# Patient Record
Sex: FEMALE | Race: BLACK OR AFRICAN AMERICAN | Employment: FULL TIME | URBAN - METROPOLITAN AREA
[De-identification: names, ages, dates, MRNs, and addresses within clinical notes are randomized per-mention and may not be internally consistent; named-entity substitution may affect disease eponyms.]

---

## 2019-06-03 ENCOUNTER — OFFICE VISIT (OUTPATIENT)
Dept: ENDOCRINOLOGY | Age: 44
End: 2019-06-03
Payer: COMMERCIAL

## 2019-06-03 VITALS
BODY MASS INDEX: 32.53 KG/M2 | WEIGHT: 219.6 LBS | OXYGEN SATURATION: 100 % | HEIGHT: 69 IN | SYSTOLIC BLOOD PRESSURE: 122 MMHG | DIASTOLIC BLOOD PRESSURE: 84 MMHG | HEART RATE: 86 BPM

## 2019-06-03 DIAGNOSIS — E53.8 B12 DEFICIENCY: ICD-10-CM

## 2019-06-03 DIAGNOSIS — E04.9 GOITER: ICD-10-CM

## 2019-06-03 DIAGNOSIS — E55.9 VITAMIN D DEFICIENCY: ICD-10-CM

## 2019-06-03 DIAGNOSIS — E66.9 CLASS 1 OBESITY WITH BODY MASS INDEX (BMI) OF 32.0 TO 32.9 IN ADULT, UNSPECIFIED OBESITY TYPE, UNSPECIFIED WHETHER SERIOUS COMORBIDITY PRESENT: ICD-10-CM

## 2019-06-03 DIAGNOSIS — E28.8 HYPERANDROGENISM: ICD-10-CM

## 2019-06-03 DIAGNOSIS — R63.5 WEIGHT GAIN, ABNORMAL: ICD-10-CM

## 2019-06-03 DIAGNOSIS — R53.83 OTHER FATIGUE: ICD-10-CM

## 2019-06-03 PROCEDURE — 99204 OFFICE O/P NEW MOD 45 MIN: CPT | Performed by: INTERNAL MEDICINE

## 2019-06-03 NOTE — PROGRESS NOTES
SUBJECTIVE:  Matthew Saez is a 40 y.o. female who is here for thyroid disease. 1. Goiter    This started in 2014. Patient was diagnosed with fatigue, weight gain. The problem has been gradually worsening. Previous thyroid studies include: TSH and free thyroxine. Patient started medication in N/A. Currently patient is on: N/A. Misses  N/A doses a month. Current complaints: fatigue, weight gain, feeling cold and cold intolerance, constipation, swelling, hair loss, acne, eyebrow loss, brittle nails, difficulty concentrating. History of obstructive symptoms: difficulty swallowing No, changes in voice/hoarseness No.  History of radiation to patient's neck: No  Resent iodine exposure: No  Family history includes hyperthyroidism, hypothyroidism. Family history of thyroid cancer: No    2. Weight gain, abnormal  Gained 36 lbs in 5 years. Very active, works out, eats healthy. Menstrual periods regular. No hirsutism. 3. Class 1 obesity with body mass index (BMI) of 32.0 to 32.9 in adult, unspecified obesity type, unspecified whether serious comorbidity present  Gained 36 lbs in 5 years. Very active, works out, eats healthy. Menstrual periods regular. No hirsutism. Takes Contrave 6 weeks 2 tablets daily. Low carb diet, now eats healthy. On phentermine before, lost weight. 4. Other fatigue  Severe, all day    5. Hyperandrogenism  Boldness on the top of the head, sudden hair loss, since 11/2018. No hirsutism. Lost eyebrows. Never pregnant despite not using protection    6. Vitamin D deficiency  Has fatigue    7. Vitamin B12 deficiency  Has fatigue. On injections before. Result Impression   Impression:  No sonographic abnormalities identified.     Dictated by Ely Lai DO        Workstation [id number]   Result Narrative   Study: US HEAD NECK OR THYROID SURVEY    Indication: Nontoxic nodular goiter,     Comparison: 4/15/2016    Findings:  Grayscale and Doppler images of the thyroid gland were obtained. The right thyroid lobe measures 5.41 x 1.75 x 1.76 cm. The left thyroid lobe measures 4.82 x 1.62 x 1.82 cm.  The thyroid isthmus measures 2.3 mm. The thyroid gland is homogeneous in echotexture.  No focal thyroid nodules or cysts identified. On the previous study dated 4/15/2016, there was a \"suggestion of a subtle solid 1.1 x 0.8 cm nodule in the inferior posterior aspect of the left lobe of thyroid gland\". This area is not identified on the current images. Other Result Information   Interface, Radiant Results - 10/19/2018  3:08 PM EDT  Study: US HEAD NECK OR THYROID SURVEY    Indication: Nontoxic nodular goiter,     Comparison: 4/15/2016    Findings:  Grayscale and Doppler images of the thyroid gland were obtained. The right thyroid lobe measures 5.41 x 1.75 x 1.76 cm. The left thyroid lobe measures 4.82 x 1.62 x 1.82 cm. The thyroid isthmus measures 2.3 mm. The thyroid gland is homogeneous in echotexture. No focal thyroid nodules or cysts identified. On the previous study dated 4/15/2016, there was a \"suggestion of a subtle solid 1.1 x 0.8 cm nodule in the inferior posterior aspect of the left lobe of thyroid gland\". This area is not identified on the current images. Impression:  No sonographic abnormalities identified.     Dictated by Mnigo Back, DO        Workstation IS:[id number]         Past Medical History:   Diagnosis Date    Cystic breast     Enlarged thyroid     Fatigue      Patient Active Problem List    Diagnosis Date Noted    Goiter 06/03/2019    Weight gain, abnormal 06/03/2019    Class 1 obesity with body mass index (BMI) of 32.0 to 32.9 in adult 06/03/2019    Other fatigue 06/03/2019    Hyperandrogenism 06/03/2019    B12 deficiency 06/03/2019    Vitamin D deficiency 06/03/2019     Past Surgical History:   Procedure Laterality Date    BREAST BIOPSY       Family History   Problem Relation Age of Onset    Thyroid Disease Mother    Andrey Pacheco Heart Failure Mother     Thyroid Disease Father     Hypertension Sister      Social History     Socioeconomic History    Marital status:      Spouse name: None    Number of children: None    Years of education: None    Highest education level: None   Occupational History    None   Social Needs    Financial resource strain: None    Food insecurity:     Worry: None     Inability: None    Transportation needs:     Medical: None     Non-medical: None   Tobacco Use    Smoking status: Former Smoker    Smokeless tobacco: Never Used   Substance and Sexual Activity    Alcohol use: Yes    Drug use: Not Currently    Sexual activity: None   Lifestyle    Physical activity:     Days per week: None     Minutes per session: None    Stress: None   Relationships    Social connections:     Talks on phone: None     Gets together: None     Attends Baptism service: None     Active member of club or organization: None     Attends meetings of clubs or organizations: None     Relationship status: None    Intimate partner violence:     Fear of current or ex partner: None     Emotionally abused: None     Physically abused: None     Forced sexual activity: None   Other Topics Concern    None   Social History Narrative    None     Current Outpatient Medications   Medication Sig Dispense Refill    naltrexone-bupropion (CONTRAVE) 8-90 MG per extended release tablet Take 1 tablet by mouth      Diclofenac Sodium (VOLTAREN PO) Take by mouth       No current facility-administered medications for this visit.       Allergies   Allergen Reactions    Hydrocodone-Acetaminophen Itching     Family Status   Relation Name Status    Mother  Alive    Father  [de-identified]    Sister  Alive       Review of Systems:  Constitutional: has fatigue, no fever, has recent weight gain, no recent weight loss, no changes in appetite  Eyes: no eye pain, has change in vision, no eye redness, no eye irritation, no double vision  Ears, nose, throat: Face: examination of head and face revealed no abnormalities  Eyes: no lid or conjunctival swelling, erythema or discharge, pupils are normal, equal, round, reactive to light  Ears/Nose: external inspection of ears and nose revealed no abnormalities, hearing is grossly normal  Oropharynx/Mouth/Face: lips, tongue and gums are normal with no lesions, the voice quality was normal  Neck: neck is supple and symmetric, with midline trachea and no masses, thyroid is enlarged  Lymphatics: normal cervical lymph nodes, normal supraclavicular nodes  Pulmonary: no increased work of breathing or signs of respiratory distress, lungs are clear to auscultation  Cardiovascular: normal heart rate and rhythm, normal S1 and S2, no murmurs and pedal pulses and 2+ bilaterally, No edema  Abdomen: abdomen is soft, non-tender with no masses  Musculoskeletal: normal gait and station and exam of the digits and nails are normal  Neurological: normal coordination and normal general cortical function      Lab Review:    No results found for: WBC, HGB, HCT, MCV, PLT  No results found for: NA, K, CL, CO2, BUN, CREATININE, GLUCOSE, CALCIUM, PROT, LABALBU, BILITOT, ALKPHOS, AST, ALT, LABGLOM, GFRAA, AGRATIO, GLOB  No results found for: TSHFT4, TSH, FT3  No results found for: LABA1C  No results found for: EAG  No results found for: CHOL  No results found for: TRIG  No results found for: HDL  No results found for: LDLCHOLESTEROL, LDLCALC  No results found for: LABVLDL, VLDL  No results found for: CHOLHDLRATIO  No results found for: LABMICR, [id number]  No results found for: ACOG28     ASSESSMENT/PLAN:  1. Goiter  Thyroid sonogram in 9/209  - T4, Free; Future  - T4; Future  - T3, Free; Future  - T3; Future  - Thyroid Peroxidase Antibody; Future  - Anti-Thyroglobulin Antibody; Future  - TSH without Reflex; Future    2. Weight gain, abnormal    - Prolactin; Future  - ACTH; Future  - Cortisol AM, Total; Future  - Follicle Stimulating Hormone;  Future  - Luteinizing Hormone; Future  - Estradiol; Future  - T4, Free; Future  - T4; Future  - T3, Free; Future  - T3; Future  - Thyroid Peroxidase Antibody; Future  - Anti-Thyroglobulin Antibody; Future  - TSH without Reflex; Future  - VITAMIN B12 & FOLATE; Future  - Vitamin D 25 Hydroxy; Future  - DHEA-Sulfate; Future  - Testosterone, free, total; Future  - 17-Hydroxyprogesterone; Future  - SALIVARY CORTISOL; Future  - SALIVARY CORTISOL; Future  - SALIVARY CORTISOL; Future    3. Class 1 obesity with body mass index (BMI) of 32.0 to 32.9 in adult, unspecified obesity type, unspecified whether serious comorbidity present  Diet, exercise. Increase Contrave if OK with PCP, she will discuss this with PCP. 4. Other fatigue    - Prolactin; Future  - ACTH; Future  - Cortisol AM, Total; Future  - Follicle Stimulating Hormone; Future  - Luteinizing Hormone; Future  - Estradiol; Future  - T4, Free; Future  - T4; Future  - T3, Free; Future  - T3; Future  - Thyroid Peroxidase Antibody; Future  - Anti-Thyroglobulin Antibody; Future  - TSH without Reflex; Future  - VITAMIN B12 & FOLATE; Future  - Vitamin D 25 Hydroxy; Future  - DHEA-Sulfate; Future  - Testosterone, free, total; Future  - 17-Hydroxyprogesterone; Future    5. Hyperandrogenism    - Prolactin; Future  - ACTH; Future  - Cortisol AM, Total; Future  - Follicle Stimulating Hormone; Future  - Luteinizing Hormone; Future  - Estradiol; Future  - T4, Free; Future  - T4; Future  - T3, Free; Future  - T3; Future  - Thyroid Peroxidase Antibody; Future  - Anti-Thyroglobulin Antibody; Future  - TSH without Reflex; Future  - VITAMIN B12 & FOLATE; Future  - Vitamin D 25 Hydroxy; Future  - DHEA-Sulfate; Future  - Testosterone, free, total; Future  - 17-Hydroxyprogesterone; Future    6. B12 deficiency    - VITAMIN B12 & FOLATE; Future    7. Vitamin D deficiency    - Vitamin D 25 Hydroxy;  Future      Reviewed and/or ordered clinical lab results Yes  Reviewed and/or ordered radiology tests Yes   Reviewed and/or ordered other diagnostic tests No  Discussed test results with performing physician No  Independently reviewed image, tracing, or specimen No  Made a decision to obtain old records No  Reviewed and summarized old records Yes  TSH 1.9  Negative antibodies  Obtained history from other than patient No    Safia Lashonda was counseled regarding symptoms of thyroid, weight management, goiter, fatigue diagnosis, course and complications of disease if inadequately treated, side effects of medications, diagnosis, treatment options, and prognosis, risks, benefits, complications, and alternatives of treatment, labs, imaging and other studies and treatment targets and goals. She understands instructions and counseling. Return in about 1 month (around 7/3/2019) for thyroid problems, fatigue, 40 min.

## 2019-07-18 ENCOUNTER — OFFICE VISIT (OUTPATIENT)
Dept: ENDOCRINOLOGY | Age: 44
End: 2019-07-18
Payer: COMMERCIAL

## 2019-07-18 VITALS
SYSTOLIC BLOOD PRESSURE: 139 MMHG | HEIGHT: 69 IN | BODY MASS INDEX: 32.53 KG/M2 | HEART RATE: 97 BPM | OXYGEN SATURATION: 99 % | DIASTOLIC BLOOD PRESSURE: 84 MMHG | WEIGHT: 219.6 LBS

## 2019-07-18 DIAGNOSIS — E04.9 GOITER: Primary | ICD-10-CM

## 2019-07-18 DIAGNOSIS — R10.84 GENERALIZED ABDOMINAL PAIN: ICD-10-CM

## 2019-07-18 DIAGNOSIS — E53.8 B12 DEFICIENCY: ICD-10-CM

## 2019-07-18 DIAGNOSIS — E55.9 VITAMIN D DEFICIENCY: ICD-10-CM

## 2019-07-18 DIAGNOSIS — E88.81 INSULIN RESISTANCE: ICD-10-CM

## 2019-07-18 DIAGNOSIS — E28.8 HYPERANDROGENISM: ICD-10-CM

## 2019-07-18 DIAGNOSIS — R53.83 OTHER FATIGUE: ICD-10-CM

## 2019-07-18 PROCEDURE — 99214 OFFICE O/P EST MOD 30 MIN: CPT | Performed by: INTERNAL MEDICINE

## 2019-07-18 NOTE — PROGRESS NOTES
History:   Procedure Laterality Date    BREAST BIOPSY       Family History   Problem Relation Age of Onset    Thyroid Disease Mother     Heart Failure Mother     Thyroid Disease Father     Hypertension Sister      Social History     Socioeconomic History    Marital status:      Spouse name: None    Number of children: None    Years of education: None    Highest education level: None   Occupational History    None   Social Needs    Financial resource strain: None    Food insecurity:     Worry: None     Inability: None    Transportation needs:     Medical: None     Non-medical: None   Tobacco Use    Smoking status: Former Smoker    Smokeless tobacco: Never Used   Substance and Sexual Activity    Alcohol use: Yes    Drug use: Not Currently    Sexual activity: None   Lifestyle    Physical activity:     Days per week: None     Minutes per session: None    Stress: None   Relationships    Social connections:     Talks on phone: None     Gets together: None     Attends Jehovah's witness service: None     Active member of club or organization: None     Attends meetings of clubs or organizations: None     Relationship status: None    Intimate partner violence:     Fear of current or ex partner: None     Emotionally abused: None     Physically abused: None     Forced sexual activity: None   Other Topics Concern    None   Social History Narrative    None     Current Outpatient Medications   Medication Sig Dispense Refill    naltrexone-bupropion (CONTRAVE) 8-90 MG per extended release tablet Take 1 tablet by mouth      Diclofenac Sodium (VOLTAREN PO) Take by mouth       No current facility-administered medications for this visit.       Allergies   Allergen Reactions    Hydrocodone-Acetaminophen Itching     Family Status   Relation Name Status    Mother  Alive    Father  Alive    Sister  Alive       Review of Systems:  Constitutional: has fatigue, no fever, has recent weight gain, no recent weight

## 2021-03-23 ENCOUNTER — VIRTUAL VISIT (OUTPATIENT)
Dept: ENDOCRINOLOGY | Age: 46
End: 2021-03-23
Payer: COMMERCIAL

## 2021-03-23 DIAGNOSIS — R10.84 GENERALIZED ABDOMINAL PAIN: ICD-10-CM

## 2021-03-23 DIAGNOSIS — E55.9 VITAMIN D DEFICIENCY: ICD-10-CM

## 2021-03-23 DIAGNOSIS — E88.81 INSULIN RESISTANCE: ICD-10-CM

## 2021-03-23 DIAGNOSIS — R63.5 WEIGHT GAIN, ABNORMAL: ICD-10-CM

## 2021-03-23 DIAGNOSIS — E04.9 GOITER: Primary | ICD-10-CM

## 2021-03-23 DIAGNOSIS — E66.9 CLASS 1 OBESITY WITH BODY MASS INDEX (BMI) OF 32.0 TO 32.9 IN ADULT, UNSPECIFIED OBESITY TYPE, UNSPECIFIED WHETHER SERIOUS COMORBIDITY PRESENT: ICD-10-CM

## 2021-03-23 DIAGNOSIS — R53.83 OTHER FATIGUE: ICD-10-CM

## 2021-03-23 DIAGNOSIS — E28.8 HYPERANDROGENISM: ICD-10-CM

## 2021-03-23 DIAGNOSIS — E53.8 B12 DEFICIENCY: ICD-10-CM

## 2021-03-23 DIAGNOSIS — L65.9 HAIR LOSS: ICD-10-CM

## 2021-03-23 PROCEDURE — 99214 OFFICE O/P EST MOD 30 MIN: CPT | Performed by: INTERNAL MEDICINE

## 2021-03-23 RX ORDER — BUPROPION HYDROCHLORIDE 300 MG/1
300 TABLET ORAL EVERY MORNING
COMMUNITY

## 2021-03-23 NOTE — PROGRESS NOTES
Always had pain. Cramping.  2/10. Has constipation. 9. Hair loss  Severe   [place]  Shampoo prescribed per dermatology  Drops drops prescribed per dermatology  [facility]    10. Insulin resistance  Diet, exercise  Has difficulty losing weight      Result Impression   Impression:  No sonographic abnormalities identified. Dictated by Mitra Velásquez DO        Workstation [id number]   Result Narrative   Study: US HEAD NECK OR THYROID SURVEY    Indication: Nontoxic nodular goiter,     Comparison: 4/15/2016    Findings:  Grayscale and Doppler images of the thyroid gland were obtained. The right thyroid lobe measures 5.41 x 1.75 x 1.76 cm. The left thyroid lobe measures 4.82 x 1.62 x 1.82 cm.  The thyroid isthmus measures 2.3 mm. The thyroid gland is homogeneous in echotexture.  No focal thyroid nodules or cysts identified. On the previous study dated 4/15/2016, there was a \"suggestion of a subtle solid 1.1 x 0.8 cm nodule in the inferior posterior aspect of the left lobe of thyroid gland\". This area is not identified on the current images. Other Result Information   Interface, Radiant Results - 10/19/2018  3:08 PM EDT  Study: US HEAD NECK OR THYROID SURVEY    Indication: Nontoxic nodular goiter,     Comparison: 4/15/2016    Findings:  Grayscale and Doppler images of the thyroid gland were obtained. The right thyroid lobe measures 5.41 x 1.75 x 1.76 cm. The left thyroid lobe measures 4.82 x 1.62 x 1.82 cm. The thyroid isthmus measures 2.3 mm. The thyroid gland is homogeneous in echotexture. No focal thyroid nodules or cysts identified. On the previous study dated 4/15/2016, there was a \"suggestion of a subtle solid 1.1 x 0.8 cm nodule in the inferior posterior aspect of the left lobe of thyroid gland\". This area is not identified on the current images. Impression:  No sonographic abnormalities identified.     Dictated by Mitra Velásquez DO        Workstation [id number]         Past Medical History:   Diagnosis Date    Cystic breast     Enlarged thyroid     Fatigue      Patient Active Problem List    Diagnosis Date Noted    Generalized abdominal pain 07/18/2019    Insulin resistance 07/18/2019    Goiter 06/03/2019    Weight gain, abnormal 06/03/2019    Class 1 obesity with body mass index (BMI) of 32.0 to 32.9 in adult 06/03/2019    Other fatigue 06/03/2019    Hyperandrogenism 06/03/2019    B12 deficiency 06/03/2019    Vitamin D deficiency 06/03/2019     Past Surgical History:   Procedure Laterality Date    BREAST BIOPSY       Family History   Problem Relation Age of Onset    Thyroid Disease Mother     Heart Failure Mother     Thyroid Disease Father     Hypertension Sister      Social History     Socioeconomic History    Marital status:      Spouse name: None    Number of children: None    Years of education: None    Highest education level: None   Occupational History    None   Social Needs    Financial resource strain: None    Food insecurity     Worry: None     Inability: None    Transportation needs     Medical: None     Non-medical: None   Tobacco Use    Smoking status: Former Smoker    Smokeless tobacco: Never Used   Substance and Sexual Activity    Alcohol use:  Yes    Drug use: Not Currently    Sexual activity: None   Lifestyle    Physical activity     Days per week: None     Minutes per session: None    Stress: None   Relationships    Social connections     Talks on phone: None     Gets together: None     Attends Confucianism service: None     Active member of club or organization: None     Attends meetings of clubs or organizations: None     Relationship status: None    Intimate partner violence     Fear of current or ex partner: None     Emotionally abused: None     Physically abused: None     Forced sexual activity: None   Other Topics Concern    None   Social History Narrative    None     Current Outpatient Medications Medication Sig Dispense Refill    buPROPion (WELLBUTRIN XL) 300 MG extended release tablet Take 300 mg by mouth every morning      Diclofenac Sodium (VOLTAREN PO) Take by mouth      naltrexone-bupropion (CONTRAVE) 8-90 MG per extended release tablet Take 1 tablet by mouth       No current facility-administered medications for this visit.       Allergies   Allergen Reactions    Hydrocodone-Acetaminophen Itching     Family Status   Relation Name Status    Mother  Alive    Father  [de-identified]    Sister  Alive       Review of Systems:  Constitutional: has fatigue, no fever, has recent weight gain, no recent weight loss, no changes in appetite  Eyes: no eye pain, has change in vision, no eye redness, no eye irritation, no double vision  Ears, nose, throat: has nasal congestion, no sore throat, no earache, no decrease in hearing, no hoarseness, no dry mouth, has sinus problems, no difficulty swallowing, no neck lumps, no dental problems, no mouth sores, no ringing in ears  Pulmonary: no shortness of breath, no wheezing, no dyspnea on exertion, no cough  Cardiovascular: no chest pain, has lower extremity edema, no orthopnea, no intermittent leg claudication, no palpitations  Gastrointestinal: has abdominal pain, no nausea, no vomiting, no diarrhea, has constipation, no dysphagia, no heartburn, has bloating  Genitourinary: no dysuria, no urinary incontinence, no urinary hesitancy, no urinary frequency, no feelings of urinary urgency, has nocturia  Musculoskeletal: no joint swelling, no joint stiffness, no joint pain, no muscle cramps, no muscle pain, no bone pain  Integument/Breast: no hair loss, no skin rashes, no skin lesions, no itching, no dry skin  Neurological: no numbness, no tingling, no weakness, no confusion, has headaches, has dizziness, no fainting, no tremors, no balance problems  Psychiatric: has anxiety, no depression, has insomnia  Hematologic/Lymphatic: no tendency for easy bleeding, no swollen lymph nodes, no tendency for easy bruising  Immunology: has seasonal allergies, no frequent infections, no frequent illnesses  Endocrine: has temperature intolerance    There were no vitals taken for this visit. Wt Readings from Last 3 Encounters:   07/18/19 219 lb 9.6 oz (99.6 kg)   06/03/19 219 lb 9.6 oz (99.6 kg)     There is no height or weight on file to calculate BMI.     OBJECTIVE:  Constitutional: no apparent distress, well developed and well nourished  Mental status: alert and awake, oriented to person, place and time, able to follow commands  Psychiatric: judgement and insight and normal, recent and remote memory are intact, mood and affect are normal  Skin: skin inspection appears normal, no significant exanthematous lesions or discoloration noted on facial skin  Head and Face: head and face inspection revealed no abnormalities, normocephalic, atraumatic  Eyes: no lid or conjunctival swelling, erythema or discharge, sclera appears normal  Ears/Nose: external inspection of ears and nose revealed no abnormalities, hearing is grossly normal  Oropharynx/Mouth/Face: lips are normal with no lesions, the voice quality was normal  Neck: neck is symmetric, no visualized mass  Pulmonary/chest: respiratory effort normal, no generalized signs of difficulty breathing or signs of respiratory distress  Musculoskeletal: normal station, normal range of motion of neck  Neurological: no facial asymmetry, normal general cortical function        Lab Review:    No results found for: WBC, HGB, HCT, MCV, PLT  No results found for: NA, K, CL, CO2, BUN, CREATININE, GLUCOSE, CALCIUM, PROT, LABALBU, BILITOT, ALKPHOS, AST, ALT, LABGLOM, GFRAA, AGRATIO, GLOB  No results found for: TSHFT4, TSH, FT3  No results found for: LABA1C  No results found for: EAG  No results found for: CHOL  No results found for: TRIG  No results found for: HDL  No results found for: LDLCHOLESTEROL, LDLCALC  No results found for: LABVLDL, VLDL  No results found for: